# Patient Record
Sex: FEMALE | Race: OTHER
[De-identification: names, ages, dates, MRNs, and addresses within clinical notes are randomized per-mention and may not be internally consistent; named-entity substitution may affect disease eponyms.]

---

## 2017-04-16 ENCOUNTER — HOSPITAL ENCOUNTER (EMERGENCY)
Dept: HOSPITAL 24 - ER | Age: 42
Discharge: HOME | End: 2017-04-16
Payer: COMMERCIAL

## 2017-04-16 VITALS — BODY MASS INDEX: 39.6 KG/M2

## 2017-04-16 VITALS — SYSTOLIC BLOOD PRESSURE: 134 MMHG | DIASTOLIC BLOOD PRESSURE: 81 MMHG

## 2017-04-16 DIAGNOSIS — J06.9: Primary | ICD-10-CM

## 2017-04-16 PROCEDURE — 87880 STREP A ASSAY W/OPTIC: CPT

## 2017-04-16 PROCEDURE — 99282 EMERGENCY DEPT VISIT SF MDM: CPT

## 2017-04-16 PROCEDURE — 71020: CPT

## 2017-04-16 PROCEDURE — 99283 EMERGENCY DEPT VISIT LOW MDM: CPT

## 2017-04-16 PROCEDURE — 87070 CULTURE OTHR SPECIMN AEROBIC: CPT

## 2017-04-16 NOTE — RAD
HISTORY:  Cough



Study: PA and lateral



Comparison:



Findings:



The trachea is midline.  The cardiac silhouette is unremarkable.  The lungs are clear without focal 
infiltrate or effusion.  The bony thorax is unremarkable.  



IMPRESSION: 

 

Stable chest with no acute abnormality seen.



Reported By:Electronically Signed by KARRI TILLMAN MD at 4/16/2017 11:08:15 PM

## 2017-04-16 NOTE — DR.GENAD
HPI





- PCP


Primary Care Physician: NFD





- Complaint/Symptoms


Chief Complaint Doctors Comments: Patient admits to cough,headache, chills, 

myalgia and sore throat for one week


Chief Complaint:: PT C/O FEVER AND COUGH X 1 WEEK MY DAUGHTER HAD INFLU  ABOUT 

A MONTH AGO





- Source


History Provided: Patient





- Mode of Arrival


Mode of Arrival: Ambulatory





- Timing


Onset of Chief Complaint: 17





PMH





- PMH


Past Medical History: No


Past Surgical History: Yes


Surgical History: , Cholecystectomy, Hysterectomy, Ortho Surgery





- Family History


History of Family Medical Conditions: Yes


Family Medical History: Diabetes Mellitus, Hypertension





- Social History


Does patient currently use any type of tobacco product: No


Have you used tobacco products in the last 12 months: No


Type of Tobacco Use: None


Does any household member use tobacco: No


Alcohol Use: None


Do you use any recreational Drugs:: No


Lives With: Family


Lives Where: Home





- infectious screening


In the last 2 months have you had wt loss of >10#?: NO


Have you had fever, night sweats or hemotysis?: No


Have you traveled outside the country in the last 6 months?: Yes


Details about travelin MONTH AGO


Isolation: Standard





ROS





- Review of Systems


Constitutional: No Symptoms Reported


Eyes: No Symptoms Reported


ENTM: No Symptoms Reported


Respiratoy: Non-Productive Cough


Cardiovascular: No Symptoms Reported


Gastrointestinal/Abdominal: No Symptoms Reported


Genitourinary: No Symptoms Reported


Neurological: Headache


Musculoskeletal: Other (myalgia)


Integumentary: No Symptoms Reported


Hematologic/Lymphatic: No Symptoms Reported


Endocrine: No Symptoms Reported


Psychiatric: No Symptoms Reported


All Other Systems: Reviewed and Negative





PE





- Vital Signs


Vitals: 


 





Temperature                      98.5 F


Pulse Rate                       100


Respiratory Rate                 18


Blood Pressure [Left Arm]        118/69


Blood Pressure                   134/81


O2 Sat by Pulse Oximetry         99











- General


Limitations: No Limitations


General Appearance: Alert, In No Apparent Distress





- Head


Head Exam: Normal Inspection, Atraumatic





- Eyes


Eye exam: Normal Appearance, PERRL, EOMI





- ENT


ENT Exam: Normal Exam, Normal Oropharynx, Normal  External Ear Exam.  negative: 

Mucous Membranes Dry


External Ear Exam: Normal External Inspection


TM/Canal Exam: Bilateral Normal


Nose Exam: negative: Normal Nose Exam (rhinorrhea), Sinus Tenderness


Mouth Exam: Normal Inspection


Throat Exam: Normal Inspection





- Neck


Neck Exam: Normal Inspection





- Chest


Chest Inspection: Normal Inspection





- Respiratory


Respiratory Exam: Normal Lung Sounds Bilat


Respiratory Exam: Bilateral Clear to Auscultation





- Cardiovascular


Cardiovascular Exam: Regular Rate, Normal Rhythm





- Abdominal Exam


Abdominal Exam: Normal Inspection


Abdominal Tenderness: negative: RUQ, RLQ, LUQ, LLQ, Epigastrium, Suprapubic, 

Diffuse, Mild, Moderate, Severe, Other





- Extremities


Extremities Exam: Normal Inspection





- Back


Back Exam: Normal Inspection





- Neurologic


Neurological Exam: Alert, Oriented X3, CN II-XII Intact





- Skin


Skin Exam: Warm, Dry, Intact





Course





- Reevaluation


1st: Unchanged





ROR





- Labs Reviewed


Laboratory Results Reviewed?: Yes (strep negative)


Laboratory: 


 











Streptococcus Screen  Negative  (NEGATIVE)   17  22:46    














- XRAY


XRAY Interpreted by: Radiologist (Chest: negative)





- Diagnosis


Discharge Problem: 


Upper respiratory infection


Qualifiers:


 URI type: unspecified viral URI Qualified Code(s): J06.9 - Acute upper 

respiratory infection, unspecified; B97.89 - Other viral agents as the cause of 

diseases classified elsewhere








- Discharge Plan


Condition: Stable





- Follow ups/Referrals


Follow ups/Referrals: 


[facility]D,None [Primary Care Provider] - 3 days





- Instructions

## 2017-04-23 ENCOUNTER — HOSPITAL ENCOUNTER (EMERGENCY)
Dept: HOSPITAL 24 - ER | Age: 42
Discharge: HOME | End: 2017-04-23
Payer: COMMERCIAL

## 2017-04-23 VITALS — BODY MASS INDEX: 39.6 KG/M2

## 2017-04-23 VITALS — DIASTOLIC BLOOD PRESSURE: 73 MMHG | SYSTOLIC BLOOD PRESSURE: 114 MMHG

## 2017-04-23 DIAGNOSIS — J40: Primary | ICD-10-CM

## 2017-04-23 DIAGNOSIS — R05: ICD-10-CM

## 2017-04-23 LAB
ALBUMIN SERPL BCP-MCNC: 3 G/DL (ref 3.4–5)
ALP SERPL-CCNC: 52 UNITS/L (ref 46–116)
ALT SERPL W P-5'-P-CCNC: 24 UNITS/L (ref 12–78)
AST SERPL W P-5'-P-CCNC: 18 UNITS/L (ref 15–37)
BACTERIA URNS QL MICRO: (no result) /HPF
BASE EXCESS BLDA CALC-SCNC: 2 MMOL/L (ref -2–2)
BASOPHILS # BLD AUTO: 0.1 X10^3/UL (ref 0–0.1)
BASOPHILS NFR BLD AUTO: 0.7 % (ref 0.2–1)
BILIRUB UR QL STRIP.AUTO: NEGATIVE
BUN SERPL-MCNC: 9 MG/DL (ref 7–18)
CALCIUM ALBUM COR SERPL-SCNC: 143 MMOL/L (ref 136–145)
CALCIUM ALBUM COR SERPL-SCNC: 9.3 MG/DL (ref 8.5–10.1)
CALCIUM SERPL-MCNC: 8.5 MG/DL (ref 8.5–10.1)
CHLORIDE SERPL-SCNC: 105 MMOL/L (ref 98–107)
CLARITY UR: (no result)
CO2 SERPL-SCNC: 24.2 MMOL/L (ref 21–32)
COLOR UR AUTO: YELLOW
CREAT SERPL-MCNC: 0.87 MG/DL (ref 0.55–1.02)
EGFR  BLACK RACES: > 60 (ref 60–?)
EOSINOPHIL # BLD AUTO: 0.4 X10^3/UL (ref 0–0.2)
EOSINOPHIL NFR BLD AUTO: 3.3 % (ref 0.9–2.9)
ERYTHROCYTE [DISTWIDTH] IN BLOOD BY AUTOMATED COUNT: 13.4 % (ref 11.6–16.5)
FRACTIONATED INSPIRED OXYGEN: 21
GLUCOSE BLD-SCNC: 136 MG/DL (ref 65–99)
GLUCOSE UR QL STRIP.AUTO: NEGATIVE
HCO3 BLDA-SCNC: 26.5 MMOL/L (ref 22–26)
HCT VFR BLD AUTO: 37 % (ref 36–47)
HGB BLD-MCNC: 12.4 G/DL (ref 12–16)
KETONES UR QL STRIP.AUTO: NEGATIVE
LEUKOCYTE ESTERASE UR QL STRIP.AUTO: (no result)
LYMPHOCYTES # BLD AUTO: 3.1 X10^3/UL (ref 1.3–2.9)
LYMPHOCYTES NFR BLD AUTO: 26.9 % (ref 21–51)
MCH RBC QN AUTO: 30.4 PG (ref 27–34)
MCHC RBC AUTO-ENTMCNC: 33.6 G/DL (ref 33–35)
MCV RBC AUTO: 90.4 FL (ref 80–100)
MONOCYTES # BLD AUTO: 0.5 X10^3/UL (ref 0.3–0.8)
MONOCYTES NFR BLD AUTO: 4.3 % (ref 0–13)
MUCOUS THREADS #/AREA URNS HPF: (no result) /HPF
NEUTROPHILS # BLD AUTO: 7.4 X10^3/UL (ref 2.2–4.8)
NEUTROPHILS NFR BLD AUTO: 64.8 % (ref 42–75)
NITRITE UR QL STRIP.AUTO: NEGATIVE
PCO2 BLDA: 40 MMHG (ref 35–45)
PH BLDA: 7.43 [PH] (ref 7.35–7.45)
PH UR STRIP.AUTO: 5 [PH] (ref 5–8)
PLATELET # BLD: 330 X10^3/UL (ref 150–450)
PMV BLD AUTO: 8.3 FL (ref 7.4–11)
PO2 BLDA: 82 MMHG (ref 80–100)
PROT SERPL-MCNC: 7.5 G/DL (ref 6.4–8.2)
PROT UR QL STRIP.AUTO: (no result)
RBC # BLD AUTO: 4.09 X10^6/UL (ref 3.5–5.4)
RBC # UR STRIP.AUTO: (no result) /UL
RBC #/AREA URNS HPF: (no result) /HPF
SAO2 % BLDA: 96 % (ref 90–100)
SODIUM SERPL-SCNC: 142 MMOL/L (ref 136–145)
SP GR UR STRIP.AUTO: 1.03 (ref 1–1.03)
SQUAMOUS URNS QL MICRO: (no result) /HPF
UROBILINOGEN UR QL STRIP.AUTO: NORMAL
WBC NRBC COR # BLD AUTO: 11.5 X10^3/UL (ref 3.6–10)

## 2017-04-23 PROCEDURE — 81001 URINALYSIS AUTO W/SCOPE: CPT

## 2017-04-23 PROCEDURE — 80053 COMPREHEN METABOLIC PANEL: CPT

## 2017-04-23 PROCEDURE — 99283 EMERGENCY DEPT VISIT LOW MDM: CPT

## 2017-04-23 PROCEDURE — 36415 COLL VENOUS BLD VENIPUNCTURE: CPT

## 2017-04-23 PROCEDURE — 87502 INFLUENZA DNA AMP PROBE: CPT

## 2017-04-23 PROCEDURE — 71010: CPT

## 2017-04-23 PROCEDURE — 87503 INFLUENZA DNA AMP PROB ADDL: CPT

## 2017-04-23 PROCEDURE — 36600 WITHDRAWAL OF ARTERIAL BLOOD: CPT

## 2017-04-23 PROCEDURE — 82803 BLOOD GASES ANY COMBINATION: CPT

## 2017-04-23 PROCEDURE — 85025 COMPLETE CBC W/AUTO DIFF WBC: CPT

## 2017-04-23 NOTE — DR.GENAD
HPI





- PCP


Primary Care Physician: CHICHO





- HPI Comment


HPI Comment: HISTORY AS BELOW.





- Complaint/Symptoms


Chief Complaint Doctors Comments: PERSISTENT COUGH, FEVER AND CHEST PAIN, TOOK 

ALEGRA AND PHERENERGAN WITH CODEINE BUT NO IMPROVEMENT GETTING WORSE.


Chief Complaint:: PT C/O COUGH GOING ON 18 DAYS PT HAS HAD PHENERGAN WITH 

CODIENE AND MEDROL DOSE LAWRENCE PT HAS COMPLETED THIS MEDICATION. PT STATES' THE 

COUGH IS NOT ANY BETTER I CAN HEAR MYSELF WHEEZING AT NIGHT"





- Nurses notes reviewed


Nurses Notes Review: Yes





- Source


History Provided: Patient





- Mode of Arrival


Mode of Arrival: Ambulatory





- Timing


Onset of Chief Complaint: 17


Came on: Gradually





- Duration


Duration: Constant


Duration: Weeks





- Severity


Severity: Moderate





PMH





- PMH


Past Medical History: No


Past Surgical History: Yes


Surgical History: , Cholecystectomy, Hysterectomy, Ortho Surgery





- Family History


History of Family Medical Conditions: Yes


Family Medical History: Diabetes Mellitus, Hypertension





- Social History


Do you use any recreational Drugs:: No


Lives Where: Home





- infectious screening


In the last 2 months have you had wt loss of >10#?: NO


Have you had fever, night sweats or hemotysis?: No


Have you traveled outside the country in the last 6 months?: Yes


Details about traveling: [place] 6 WEEKS AGO


Isolation: Standard





ROS





- Review of Systems


Constitutional: Chills, Fever, Weakness, Fatigue


Eyes: No Symptoms Reported.  negative: Eye Pain, Discharge


ENTM: Nose Congestion.  negative: Ear Pain, Nose Discharge, Throat Pain


Respiratoy: Productive Cough, Short of Breath, Wheezing


Cardiovascular: Chest Pain


Gastrointestinal/Abdominal: No Symptoms Reported


Genitourinary: No Symptoms Reported


Neurological: No Symptoms Reported


Musculoskeletal: No Symptoms Reported


Integumentary: No Symptoms Reported


Hematologic/Lymphatic: No Symptoms Reported


Endocrine: No Symptoms Reported


All Other Systems: Reviewed and Negative





PE





- Vital Signs


Vitals: 


 





Temperature                      98.2 F


Pulse Rate                       104


Respiratory Rate                 18


Blood Pressure [Left Arm]        118/69


Blood Pressure                   114/73


O2 Sat by Pulse Oximetry         98











- General


Limitations: No Limitations


General Appearance: Alert





- Head


Head Exam: Normal Inspection





- Eyes


Eye exam: Normal Appearance





- ENT


ENT Exam: Normal  External Ear Exam


External Ear Exam: Normal External Inspection


TM/Canal Exam: Bilateral Bulging


Nose Exam: Normal Nose Exam


Mouth Exam: Normal Inspection


Throat Exam: Normal Inspection





- Neck


Neck Exam: Trachea Midline.  negative: Tenderness, Meningismus, Lymphadenopathy





- Chest


Chest Inspection: Symmetric Chest Wall Rise





- Respiratory


Respiratory Exam: Normal Lung Sounds Bilat


Respiratory Exam: Bilateral Clear to Auscultation





- Cardiovascular


Cardiovascular Exam: Regular Rate, Normal Rhythm, Normal Heart Sounds





- Abdominal Exam


Abdominal Exam: Normal Bowel Sounds, Soft.  negative: Tenderness





- Extremities


Extremities Exam: Normal Inspection





- Back


Back Exam: Normal Inspection





- Neurologic


Neurological Exam: Alert, Oriented X3





- Psychiatric


Psychiatric Exam: Anxious





- Skin


Skin Exam: Normal Color





MDM





- Additional Information


Additional Information Obtained From: Family





- Differential Diagnosis


Differential Diagnosis: PERSISTENT COUGH, PNEUMONIA, BRONCHITIS





Course





- Treatment


Treatment: SEE ORDERS.





- Education/Counseling


Education/Counseling: Patient, Family, Education


Educated On: Diagnosis





ROR





- Labs Reviewed


Result Diagrams: 


 17 20:42





 17 20:42


Laboratory: 


 











WBC  11.5 X10^3/uL (3.6-10.0)  H  17  20:42    


 


RBC  4.09 X10^6/uL (3.5-5.4)   17  20:42    


 


Hgb  12.4 g/dL (12.0-16.0)   17  20:42    


 


Hct  37.0 % (36.0-47.0)   17  20:42    


 


MCV  90.4 fL (80.0-100.0)   17  20:42    


 


MCH  30.4 pg (27.0-34.0)   17  20:42    


 


MCHC  33.6 g/dL (33.0-35.0)   17  20:42    


 


RDW  13.4 % (11.6-16.5)   17  20:42    


 


Plt Count  330 X10^3/uL (150.0-450.0)   17  20:42    


 


MPV  8.3 fL (7.4-11.0)   17  20:42    


 


Neut %  64.8 % (42.0-75.0)   17  20:42    


 


Lymph %  26.9 % (21.0-51.0)   17  20:42    


 


Mono %  4.3 % (0.0-13.0)   17  20:42    


 


Eos %  3.3 % (0.9-2.9)  H  17  20:42    


 


Baso %  0.7 % (0.2-1.0)   17  20:42    


 


Neut #  7.4 x10^3/uL (2.2-4.8)  H  17  20:42    


 


Lymph #  3.1 X10^3/uL (1.3-2.9)  H  17  20:42    


 


Mono #  0.5 x10^3/uL (0.3-0.8)   17  20:42    


 


Eos #  0.4 x10^3/uL (0.0-0.2)  H  17  20:42    


 


Baso #  0.1 X10^3/uL (0.0-0.1)   17  20:42    


 


Absolute Nucleated RBC  0.0 /100WBC  17  20:42    


 


Sample Site  [facility]   17  21:37    


 


ABG pH  7.430  (7.35-7.45)   17  21:37    


 


ABG pCO2  40.0 mmHg (35.0-45.0)   17  21:37    


 


ABG pO2  82.0 mmHg (80.0-100.0)   17  21:37    


 


ABG HCO3  26.5 mmol/L (22-26)  H  17  21:37    


 


ABG O2 Saturation  96.0 % ()   17  21:37    


 


ABG Base Excess  2.0 mmol/L (-2.0-2.0)   17  21:37    


 


[place] Test  Na   17  21:37    


 


A-a Gradient  18.0 mmHg  17  21:37    


 


FiO2  21.000   17  21:37    


 


Blood Gas Comments  [facility]olores abg well-mtf   17  21:37    


 


Sodium  142 mmol/L (136-145)   17  20:42    


 


Corrected Sodium  143 mmol/L (136-145)   17  20:42    


 


Potassium  3.3 mmol/L (3.5-5.1)  L  17  20:42    


 


Chloride  105 mmol/L ()   17  20:42    


 


Carbon Dioxide  24.2 mmol/L (21-32)   17  20:42    


 


BUN  9 mg/dL (7-18)   17  20:42    


 


Creatinine  0.87 mg/dL (0.55-1.02)   17  20:42    


 


Est GFR (MDRD) Af Amer  > 60  (>60)   17  20:42    


 


Est GFR (MDRD) Non-Af  > 60  (>60)   17  20:42    


 


Glucose  136 mg/dL (65-99)  H  17  20:42    


 


Calcium  8.5 mg/dL (8.5-10.1)   17  20:42    


 


Corrected Calcium  9.3 mg/dL (8.5-10.1)   17  20:42    


 


Total Bilirubin  0.30 mg/dL (0.2-1.0)   17  20:42    


 


AST  18 Units/L (15-37)   17  20:42    


 


ALT  24 Units/L (12-78)   17  20:42    


 


Alkaline Phosphatase  52 Units/L ()   17  20:42    


 


Total Protein  7.5 g/dL (6.4-8.2)   17  20:42    


 


Albumin  3.0 g/dL (3.4-5.0)  L  17  20:42    


 


Globulin  4.5 g/dL (2.5-4.5)   17  20:42    


 


Albumin/Globulin Ratio  0.7 Ratio (1.1-2.1)  L  17  20:42    


 


Specimen Type  Clean catch urine   17  21:06    


 


Urine Color  Yellow  (YELLOW)   17  21:06    


 


Urine Appearance  Slightly hazy  (CLEAR)   17  21:06    


 


Urine pH  5.0  (5.0 - 8.0)   17  21:06    


 


Ur Specific Gravity  1.030  (1.000-1.030)   17  21:06    


 


Urine Protein  1+  (NEGATIVE)   17  21:06    


 


Urine Glucose (UA)  Negative  (NEGATIVE)   17  21:06    


 


Urine Ketones  Negative  (NEGATIVE)   17  21:06    


 


Urine Occult Blood  2+  (NEGATIVE)   17  21:06    


 


Urine Nitrite  Negative  (NEGATIVE)   17  21:06    


 


Urine Bilirubin  Negative  (NEGATIVE)   17  21:06    


 


Urine Urobilinogen  Normal  (NORMAL)   17  21:06    


 


Ur Leukocyte Esterase  1+  (NEGATIVE)   17  21:06    


 


Urine RBC  0-3 /HPF (NEGATIVE)   17  21:06    


 


Urine WBC  3-5 /HPF (NEGATIVE)   17  21:06    


 


Ur Squamous Epith Cells  Moderate /HPF (NEGATIVE)   17  21:06    


 


Urine Bacteria  1+ /HPF (NEGATIVE)   17  21:06    


 


Urine Mucus  Moderate /HPF (NEGATIVE)   17  21:06    


 


Ur Culture Indicated?  No/not indicated   17  21:06    


 


Influenza A (H1N1) PCR  Not detected  (NOT DETECT)   17  21:27    


 


Influenza Type A (PCR)  Negative  (NEGATIVE)   17  21:27    


 


Influenza Type B (PCR)  Negative  (NEGATIVE)   17  21:27    














- Diagnosis


Discharge Problem: 


 Bronchitis, Cough








- Discharge Plan


Disposition: 01 HOME, SELF-CARE


Condition: Stable


Prescriptions: 


Amoxicillin [Amoxil 875 mg] 875 mg PO BID #20 tab


Benzonatate [TESSALON PERLES *] 200 mg PO TID PRN #30 cap


 PRN Reason: Cough





- Follow ups/Referrals


Follow ups/Referrals: 


NFD,None [Primary Care Provider] - 3 days





- Instructions


Instructions:  Acute Bronchitis, Cough, Adult, Easy-to-Read

## 2017-04-23 NOTE — RAD
EXAM:   Chest X-ray



INDICATION:  

Cough

 

COMPARISION:  

Prior exam from April 16, 2017

 

TECHNIQUE:  

AP, single view



FINDINGS:  

The lungs are clear in the lung volumes are within normal limits. No pleural effusion or pneumothora
x. The cardiac silhouette and mediastinum are normal.  The regional skeleton is intact.

 

IMPRESSION:  

Normal Chest X-Ray





Reported By:Electronically Signed by FELICE RAMOS MD at 4/23/2017 9:08:18 PM

## 2017-10-21 ENCOUNTER — HOSPITAL ENCOUNTER (EMERGENCY)
Dept: HOSPITAL 24 - ER | Age: 42
Discharge: HOME | End: 2017-10-21
Payer: SELF-PAY

## 2017-10-21 VITALS — DIASTOLIC BLOOD PRESSURE: 82 MMHG | SYSTOLIC BLOOD PRESSURE: 124 MMHG

## 2017-10-21 VITALS — BODY MASS INDEX: 36.8 KG/M2

## 2017-10-21 DIAGNOSIS — R11.10: Primary | ICD-10-CM

## 2017-10-21 DIAGNOSIS — B34.9: ICD-10-CM

## 2017-10-21 LAB
ALBUMIN SERPL BCP-MCNC: 3.2 G/DL (ref 3.4–5)
ALP SERPL-CCNC: 74 UNITS/L (ref 46–116)
ALT SERPL W P-5'-P-CCNC: 50 UNITS/L (ref 12–78)
AST SERPL W P-5'-P-CCNC: 31 UNITS/L (ref 15–37)
BASOPHILS # BLD AUTO: 0.1 X10^3/UL (ref 0–0.1)
BASOPHILS NFR BLD AUTO: 0.8 % (ref 0.2–1)
BUN SERPL-MCNC: 10 MG/DL (ref 7–18)
CALCIUM ALBUM COR SERPL-SCNC: (no result) MMOL/L (ref 136–145)
CALCIUM ALBUM COR SERPL-SCNC: 9.7 MG/DL (ref 8.5–10.1)
CALCIUM SERPL-MCNC: 9.1 MG/DL (ref 8.5–10.1)
CHLORIDE SERPL-SCNC: 103 MMOL/L (ref 98–107)
CO2 SERPL-SCNC: 29.9 MMOL/L (ref 21–32)
CREAT SERPL-MCNC: 0.81 MG/DL (ref 0.55–1.02)
EGFR  BLACK RACES: > 60 (ref 60–?)
EOSINOPHIL # BLD AUTO: 0.2 X10^3/UL (ref 0–0.2)
EOSINOPHIL NFR BLD AUTO: 2.4 % (ref 0.9–2.9)
ERYTHROCYTE [DISTWIDTH] IN BLOOD BY AUTOMATED COUNT: 14 % (ref 11.6–16.5)
HCT VFR BLD AUTO: 38.7 % (ref 36–47)
HGB BLD-MCNC: 13.2 G/DL (ref 12–16)
LYMPHOCYTES # BLD AUTO: 2.1 X10^3/UL (ref 1.3–2.9)
LYMPHOCYTES NFR BLD AUTO: 24.3 % (ref 21–51)
MCH RBC QN AUTO: 30.2 PG (ref 27–34)
MCHC RBC AUTO-ENTMCNC: 34 G/DL (ref 33–35)
MCV RBC AUTO: 88.8 FL (ref 80–100)
MONOCYTES # BLD AUTO: 0.4 X10^3/UL (ref 0.3–0.8)
MONOCYTES NFR BLD AUTO: 5.2 % (ref 0–13)
NEUTROPHILS # BLD AUTO: 5.7 X10^3/UL (ref 2.2–4.8)
NEUTROPHILS NFR BLD AUTO: 67.3 % (ref 42–75)
PLATELET # BLD: 298 X10^3/UL (ref 150–450)
PMV BLD AUTO: 8.7 FL (ref 7.4–11)
PROT SERPL-MCNC: 8.3 G/DL (ref 6.4–8.2)
RBC # BLD AUTO: 4.36 X10^6/UL (ref 3.5–5.4)
SODIUM SERPL-SCNC: 138 MMOL/L (ref 136–145)
WBC NRBC COR # BLD AUTO: 8.5 X10^3/UL (ref 3.6–10)

## 2017-10-21 PROCEDURE — 80053 COMPREHEN METABOLIC PANEL: CPT

## 2017-10-21 PROCEDURE — 96374 THER/PROPH/DIAG INJ IV PUSH: CPT

## 2017-10-21 PROCEDURE — 99283 EMERGENCY DEPT VISIT LOW MDM: CPT

## 2017-10-21 PROCEDURE — 36415 COLL VENOUS BLD VENIPUNCTURE: CPT

## 2017-10-21 PROCEDURE — 85025 COMPLETE CBC W/AUTO DIFF WBC: CPT

## 2017-10-21 PROCEDURE — 96365 THER/PROPH/DIAG IV INF INIT: CPT

## 2017-10-21 PROCEDURE — 96372 THER/PROPH/DIAG INJ SC/IM: CPT

## 2017-10-21 NOTE — DR.GENAD
HPI





- PCP


Primary Care Physician: NFD





- Complaint/Symptoms


Chief Complaint Doctors Comments: Patient denies fever or diarrhea


Chief Complaint:: "VOMITING SINCE 1 AM THIS MORNING I FEEL REAL DIZZY" STATES PT





- Source


History Provided: Patient





- Mode of Arrival


Mode of Arrival: Ambulatory





- Timing


Onset of Chief Complaint: 10/21/17





PMH





- PMH


Past Medical History: No


Past Surgical History: Yes


Surgical History: GYN Surgery


Past Surgical History Comment: PARITAL HYSTERCTOMY





- Family History


History of Family Medical Conditions: Yes


Family Medical History: Diabetes Mellitus, Hypertension





- Social History


Does any household member use tobacco: No


Alcohol Use: None


Do you use any recreational Drugs:: No


Lives With: Family


Lives Where: Home





- infectious screening


In the last 2 months have you had wt loss of >10#?: NO


Have you had fever, night sweats or hemotysis?: No


Have you traveled outside the country in the last 6 months?: No


Isolation: Standard





ROS





- Review of Systems


Eyes: No Symptoms Reported


ENTM: No Symptoms Reported


Respiratoy: No Symptoms Reported


Cardiovascular: No Symptoms Reported


Gastrointestinal/Abdominal: No Symptoms Reported


Genitourinary: No Symptoms Reported


Neurological: No Symptoms Reported


Musculoskeletal: No Symptoms Reported


Integumentary: No Symptoms Reported


Hematologic/Lymphatic: No Symptoms Reported


Endocrine: No Symptoms Reported


Psychiatric: No Symptoms Reported


All Other Systems: Reviewed and Negative





PE





- Vital Signs


Vitals: 


 





Temperature                      98.2 F


Pulse Rate                       77


Respiratory Rate                 18


Blood Pressure [Left Arm]        118/69


Blood Pressure                   107/69


O2 Sat by Pulse Oximetry         98











- General


Limitations: No Limitations


General Appearance: Alert, In No Apparent Distress





- Head


Head Exam: Normal Inspection, Atraumatic





- Eyes


Eye exam: PERRL, EOMI





- ENT


ENT Exam: Normal Exam


External Ear Exam: Normal External Inspection


TM/Canal Exam: Bilateral Normal


Nose Exam: Normal Nose Exam


Mouth Exam: Normal Inspection


Throat Exam: Normal Inspection





- Neck


Neck Exam: Normal Inspection, Full ROM





- Chest


Chest Inspection: Normal Inspection





- Respiratory


Respiratory Exam: Normal Lung Sounds Bilat


Respiratory Exam: Bilateral Clear to Auscultation





- Cardiovascular


Cardiovascular Exam: Regular Rate





- Abdominal Exam


Abdominal Exam: Normal Inspection, Normal Bowel Sounds


Abdominal Tenderness: negative: RUQ, RLQ, LUQ, LLQ, Epigastrium, Suprapubic, 

Diffuse, Mild, Moderate, Severe, Other





- Extremities


Extremities Exam: Normal Inspection, Full ROM





- Back


Back Exam: Normal Inspection, Full ROM





- Neurologic


Neurological Exam: Alert, Oriented X3, CN II-XII Intact





- Psychiatric


Psychiatric Exam: Normal Affect





- Skin


Skin Exam: Warm, Dry, Intact





Course





- Reevaluation


1st: Improved





ROR





- Labs Reviewed


Result Diagrams: 


 10/21/17 21:50





 10/21/17 21:50


Laboratory: 


 











WBC  8.5 X10^3/uL (3.6-10.0)   10/21/17  21:50    


 


RBC  4.36 X10^6/uL (3.5-5.4)   10/21/17  21:50    


 


Hgb  13.2 g/dL (12.0-16.0)   10/21/17  21:50    


 


Hct  38.7 % (36.0-47.0)   10/21/17  21:50    


 


MCV  88.8 fL (80.0-100.0)   10/21/17  21:50    


 


MCH  30.2 pg (27.0-34.0)   10/21/17  21:50    


 


MCHC  34.0 g/dL (33.0-35.0)   10/21/17  21:50    


 


RDW  14.0 % (11.6-16.5)   10/21/17  21:50    


 


Plt Count  298 X10^3/uL (150.0-450.0)   10/21/17  21:50    


 


MPV  8.7 fL (7.4-11.0)   10/21/17  21:50    


 


Neut %  67.3 % (42.0-75.0)   10/21/17  21:50    


 


Lymph %  24.3 % (21.0-51.0)   10/21/17  21:50    


 


Mono %  5.2 % (0.0-13.0)   10/21/17  21:50    


 


Eos %  2.4 % (0.9-2.9)   10/21/17  21:50    


 


Baso %  0.8 % (0.2-1.0)   10/21/17  21:50    


 


Neut #  5.7 x10^3/uL (2.2-4.8)  H  10/21/17  21:50    


 


Lymph #  2.1 X10^3/uL (1.3-2.9)   10/21/17  21:50    


 


Mono #  0.4 x10^3/uL (0.3-0.8)   10/21/17  21:50    


 


Eos #  0.2 x10^3/uL (0.0-0.2)   10/21/17  21:50    


 


Baso #  0.1 X10^3/uL (0.0-0.1)   10/21/17  21:50    


 


Absolute Nucleated RBC  0.0 /100WBC  10/21/17  21:50    


 


Sodium  138 mmol/L (136-145)   10/21/17  21:50    


 


Corrected Sodium  TNP   10/21/17  21:50    


 


Potassium  4.4 mmol/L (3.5-5.1)   10/21/17  21:50    


 


Chloride  103 mmol/L ()   10/21/17  21:50    


 


Carbon Dioxide  29.9 mmol/L (21-32)   10/21/17  21:50    


 


BUN  10 mg/dL (7-18)   10/21/17  21:50    


 


Creatinine  0.81 mg/dL (0.55-1.02)   10/21/17  21:50    


 


Est GFR (MDRD) Af Amer  > 60  (>60)   10/21/17  21:50    


 


Est GFR (MDRD) Non-Af  > 60  (>60)   10/21/17  21:50    


 


Glucose  103 mg/dL (65-99)  H  10/21/17  21:50    


 


Calcium  9.1 mg/dL (8.5-10.1)   10/21/17  21:50    


 


Corrected Calcium  9.7 mg/dL (8.5-10.1)   10/21/17  21:50    


 


Total Bilirubin  0.40 mg/dL (0.2-1.0)   10/21/17  21:50    


 


AST  31 Units/L (15-37)   10/21/17  21:50    


 


ALT  50 Units/L (12-78)   10/21/17  21:50    


 


Alkaline Phosphatase  74 Units/L ()   10/21/17  21:50    


 


Total Protein  8.3 g/dL (6.4-8.2)  H  10/21/17  21:50    


 


Albumin  3.2 g/dL (3.4-5.0)  L  10/21/17  21:50    


 


Globulin  5.1 g/dL (2.5-4.5)  H  10/21/17  21:50    


 


Albumin/Globulin Ratio  0.6 Ratio (1.1-2.1)  L  10/21/17  21:50    














- Diagnosis


Discharge Problem: 


 acute viral ilness








- Discharge Plan


Condition: Stable





- Follow ups/Referrals


Follow ups/Referrals: 


NFD,None [Primary Care Provider] - 3 days





- Instructions

## 2018-04-13 ENCOUNTER — HOSPITAL ENCOUNTER (EMERGENCY)
Dept: HOSPITAL 24 - ER | Age: 43
Discharge: HOME | End: 2018-04-13
Payer: COMMERCIAL

## 2018-04-13 VITALS — SYSTOLIC BLOOD PRESSURE: 136 MMHG | DIASTOLIC BLOOD PRESSURE: 80 MMHG

## 2018-04-13 VITALS — BODY MASS INDEX: 39.3 KG/M2
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
## 2018-04-13 DIAGNOSIS — S62.634A: Primary | ICD-10-CM
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
## 2018-04-13 PROCEDURE — 73130 X-RAY EXAM OF HAND: CPT

## 2018-04-13 PROCEDURE — 96372 THER/PROPH/DIAG INJ SC/IM: CPT

## 2018-04-13 PROCEDURE — 99282 EMERGENCY DEPT VISIT SF MDM: CPT

## 2018-04-13 NOTE — RAD
Examination: Right hand, three views



History: Car door injury 4th finger



Findings: There is an acute minimally displaced fracture involving the tuft of the distal phalanx, 4t
h finger. There is surrounding soft tissue swelling but no evidence for joint space involvement or ra
diopaque foreign body.



Impression: Fracture distal phalanx right 4th finger.



Reported By:Electronically Signed by DORIS CRAWFORD MD at 4/13/2018 5:38:13 PM

## 2019-02-05 NOTE — DR.GENAD
HPI





- PCP


Primary Care Physician: tellez





- Complaint/Symptoms


Chief Complaint:: patient stated the carport door slammed on her right hand and 

smashed her fingers. swelling and brusing noted to 3rd and 4th digit.





- Nurses notes reviewed


Nurses Notes Review: Yes





- Source


History Provided: Patient





- Mode of Arrival


Mode of Arrival: Ambulatory





- Timing


Onset of Chief Complaint: 04/13/18





PMH





- PMH


Past Medical History: No (no contrb PMH)


Past Surgical History: Yes


Surgical History: GYN Surgery, Hysterectomy





- Family History


History of Family Medical Conditions: Yes


Family Medical History: Diabetes Mellitus, Hypertension





- Social History


Does patient currently use any type of tobacco product: No


Have you used tobacco products in the last 12 months: No


Type of Tobacco Use: None


Does any household member use tobacco: No


Do you use any recreational Drugs:: No


Lives With: Family


Lives Where: Home





- infectious screening


In the last 2 months have you had wt loss of >10#?: NO


Have you had fever, night sweats or hemotysis?: No


Have you traveled outside the country in the last 6 months?: No


Isolation: Standard





ROS





- Review of Systems


Constitutional: No Symptoms Reported


Eyes: No Symptoms Reported


ENTM: No Symptoms Reported


Respiratoy: No Symptoms Reported


Cardiovascular: No Symptoms Reported


Gastrointestinal/Abdominal: No Symptoms Reported


Genitourinary: No Symptoms Reported


Neurological: No Symptoms Reported


Musculoskeletal: Right, Hand


Integumentary: No Symptoms Reported


Hematologic/Lymphatic: No Symptoms Reported


Endocrine: No Symptoms Reported


Psychiatric: No Symptoms Reported


All Other Systems: Reviewed and Negative





PE





- Vital Signs


Vitals: 





 





Temperature                      98.1 F


Pulse Rate                       83


Respiratory Rate                 18


Blood Pressure [Left Arm]        124/82


Blood Pressure                   136/80


O2 Sat by Pulse Oximetry         98











- General


Limitations: No Limitations


General Appearance: Alert, In No Apparent Distress





- Head


Head Exam: Normal Inspection





- Eyes


Eye exam: Normal Appearance





- ENT


ENT Exam: Normal Exam





- Neck


Neck Exam: Normal Inspection, Full ROM, Trachea Midline





- Chest


Chest Inspection: Normal Inspection





- Respiratory


Respiratory Exam: Normal Lung Sounds Bilat


Respiratory Exam: Bilateral Clear to Auscultation





- Cardiovascular


Cardiovascular Exam: Regular Rate, Normal Rhythm, Normal Heart Sounds





- Abdominal Exam


Abdominal Exam: Normal Bowel Sounds, Soft





ROR





- XRAY


XRAY Interpreted by: Radiologist


XRAY Findings: min displaced fx distal phalynx rt 4th finger, nothing else acute





- Diagnosis


Discharge Problem: 


 Finger fracture, right








- Discharge Plan


Disposition: 01 HOME, SELF-CARE


Condition: Stable


Prescriptions: 


Hydrocodone-Acet 5 mg/325 mg [Norco 5/325 mg Tab] 1 tab PO Q6H PRN #12 tab


 PRN Reason: Pain


Ketorolac Tromethamine 10 mg PO TID #12 tablet





- Follow ups/Referrals


Follow ups/Referrals: 


REID TELLEZ [Primary Care Provider] - 3 days





- Instructions


Instructions:  Cast or Splint Care, Adult, Easy-to-Read, Finger Fracture





Additional Notes





- Additional Notes


Additional Notes: rt hand 3rd, 4th fingers swollen, tender.  No lac, no nail 

avulsion.  flexion, ext normal but with pain Mother